# Patient Record
Sex: FEMALE | Race: WHITE | URBAN - METROPOLITAN AREA
[De-identification: names, ages, dates, MRNs, and addresses within clinical notes are randomized per-mention and may not be internally consistent; named-entity substitution may affect disease eponyms.]

---

## 2017-07-07 ENCOUNTER — TELEPHONE (OUTPATIENT)
Dept: FAMILY MEDICINE CLINIC | Facility: CLINIC | Age: 52
End: 2017-07-07

## 2017-07-07 RX ORDER — FUROSEMIDE 40 MG/1
TABLET ORAL DAILY PRN
Qty: 30 TABLET | Refills: 0 | Status: SHIPPED | OUTPATIENT
Start: 2017-07-07 | End: 2017-08-07

## 2017-07-07 NOTE — TELEPHONE ENCOUNTER
Pt last seen 12/2016 with Angel Goldman. Approved 30 days of med until seen. Please call patient and have her schedule her 6 month follow up for any further additional refills.  thanks

## 2017-08-07 ENCOUNTER — TELEPHONE (OUTPATIENT)
Dept: FAMILY MEDICINE CLINIC | Facility: CLINIC | Age: 52
End: 2017-08-07

## 2017-08-07 RX ORDER — FUROSEMIDE 40 MG/1
TABLET ORAL DAILY PRN
Qty: 30 TABLET | Refills: 0 | Status: SHIPPED | OUTPATIENT
Start: 2017-08-07 | End: 2017-08-22

## 2017-08-07 NOTE — TELEPHONE ENCOUNTER
Furosemide approved qty 30 NR  Future Appointments  Date Time Provider Kaleigh Chloe   8/18/2017 11:00 AM John Lazo PA-C [facility] [place]

## 2017-08-07 NOTE — TELEPHONE ENCOUNTER
Reny Cortés is coming in to see Minerva Martínez on Aug 17th she needs a refill on her water pill sent to her pharmacy on file.

## 2017-08-18 ENCOUNTER — OFFICE VISIT (OUTPATIENT)
Dept: FAMILY MEDICINE CLINIC | Facility: CLINIC | Age: 52
End: 2017-08-18

## 2017-08-18 ENCOUNTER — APPOINTMENT (OUTPATIENT)
Dept: LAB | Age: 52
End: 2017-08-18
Attending: FAMILY MEDICINE
Payer: OTHER GOVERNMENT

## 2017-08-18 VITALS
BODY MASS INDEX: 47.09 KG/M2 | SYSTOLIC BLOOD PRESSURE: 152 MMHG | HEIGHT: 66 IN | HEART RATE: 92 BPM | DIASTOLIC BLOOD PRESSURE: 102 MMHG | WEIGHT: 293 LBS

## 2017-08-18 DIAGNOSIS — E78.5 HYPERLIPIDEMIA, UNSPECIFIED HYPERLIPIDEMIA TYPE: ICD-10-CM

## 2017-08-18 DIAGNOSIS — M79.7 FIBROMYALGIA: ICD-10-CM

## 2017-08-18 DIAGNOSIS — F17.200 SMOKER UNMOTIVATED TO QUIT: ICD-10-CM

## 2017-08-18 DIAGNOSIS — I10 ESSENTIAL HYPERTENSION WITH GOAL BLOOD PRESSURE LESS THAN 140/90: ICD-10-CM

## 2017-08-18 DIAGNOSIS — R73.01 IMPAIRED FASTING GLUCOSE: ICD-10-CM

## 2017-08-18 DIAGNOSIS — E53.8 VITAMIN B 12 DEFICIENCY: ICD-10-CM

## 2017-08-18 DIAGNOSIS — E55.9 VITAMIN D DEFICIENCY: ICD-10-CM

## 2017-08-18 DIAGNOSIS — I89.0 LYMPHEDEMA OF BOTH LOWER EXTREMITIES: ICD-10-CM

## 2017-08-18 DIAGNOSIS — E03.9 HYPOTHYROIDISM, UNSPECIFIED TYPE: Primary | ICD-10-CM

## 2017-08-18 DIAGNOSIS — F40.01 AGORAPHOBIA WITH PANIC DISORDER: ICD-10-CM

## 2017-08-18 DIAGNOSIS — R89.9 ABNORMAL LABORATORY TEST RESULT: ICD-10-CM

## 2017-08-18 LAB
25-HYDROXYVITAMIN D (TOTAL): 8.4 NG/ML (ref 30–100)
ALBUMIN SERPL-MCNC: 3.5 G/DL (ref 3.5–4.8)
ALP LIVER SERPL-CCNC: 133 U/L (ref 41–108)
ALT SERPL-CCNC: 51 U/L (ref 14–54)
AST SERPL-CCNC: 24 U/L (ref 15–41)
BASOPHILS # BLD AUTO: 0.09 X10(3) UL (ref 0–0.1)
BASOPHILS NFR BLD AUTO: 0.8 %
BILIRUB SERPL-MCNC: 0.3 MG/DL (ref 0.1–2)
BILIRUBIN: NEGATIVE
BUN BLD-MCNC: 6 MG/DL (ref 8–20)
CALCIUM BLD-MCNC: 10.8 MG/DL (ref 8.3–10.3)
CHLORIDE: 109 MMOL/L (ref 101–111)
CHOLEST SMN-MCNC: 240 MG/DL (ref ?–200)
CO2: 21 MMOL/L (ref 22–32)
CREAT BLD-MCNC: 0.67 MG/DL (ref 0.55–1.02)
EOSINOPHIL # BLD AUTO: 0.09 X10(3) UL (ref 0–0.3)
EOSINOPHIL NFR BLD AUTO: 0.8 %
ERYTHROCYTE [DISTWIDTH] IN BLOOD BY AUTOMATED COUNT: 14.4 % (ref 11.5–16)
EST. AVERAGE GLUCOSE BLD GHB EST-MCNC: 140 MG/DL (ref 68–126)
FREE T4: 1.3 NG/DL (ref 0.9–1.8)
GLUCOSE (URINE DIPSTICK): NEGATIVE MG/DL
GLUCOSE BLD-MCNC: 107 MG/DL (ref 70–99)
HAV AB SERPL IA-ACNC: 248 PG/ML (ref 193–986)
HBA1C MFR BLD HPLC: 6.5 % (ref ?–5.7)
HCT VFR BLD AUTO: 47.9 % (ref 34–50)
HDLC SERPL-MCNC: 43 MG/DL (ref 45–?)
HDLC SERPL: 5.58 {RATIO} (ref ?–4.44)
HGB BLD-MCNC: 15.5 G/DL (ref 12–16)
IMMATURE GRANULOCYTE COUNT: 0.04 X10(3) UL (ref 0–1)
IMMATURE GRANULOCYTE RATIO %: 0.3 %
KETONES (URINE DIPSTICK): NEGATIVE MG/DL
LDLC SERPL CALC-MCNC: 154 MG/DL (ref ?–130)
LDLC SERPL-MCNC: 43 MG/DL (ref 5–40)
LEUKOCYTES: NEGATIVE
LYMPHOCYTES # BLD AUTO: 2.6 X10(3) UL (ref 0.9–4)
LYMPHOCYTES NFR BLD AUTO: 22.3 %
M PROTEIN MFR SERPL ELPH: 7.5 G/DL (ref 6.1–8.3)
MCH RBC QN AUTO: 31.7 PG (ref 27–33.2)
MCHC RBC AUTO-ENTMCNC: 32.4 G/DL (ref 31–37)
MCV RBC AUTO: 98 FL (ref 81–100)
MONOCYTES # BLD AUTO: 0.55 X10(3) UL (ref 0.1–0.6)
MONOCYTES NFR BLD AUTO: 4.7 %
MULTISTIX LOT#: NORMAL NUMERIC
NEUTROPHIL ABS PRELIM: 8.29 X10 (3) UL (ref 1.3–6.7)
NEUTROPHILS # BLD AUTO: 8.29 X10(3) UL (ref 1.3–6.7)
NEUTROPHILS NFR BLD AUTO: 71.1 %
NITRITE, URINE: NEGATIVE
NONHDLC SERPL-MCNC: 197 MG/DL (ref ?–130)
PH, URINE: 6 (ref 4.5–8)
PLATELET # BLD AUTO: 223 10(3)UL (ref 150–450)
POTASSIUM SERPL-SCNC: 4.1 MMOL/L (ref 3.6–5.1)
PROTEIN (URINE DIPSTICK): NEGATIVE MG/DL
RBC # BLD AUTO: 4.89 X10(6)UL (ref 3.8–5.1)
RED CELL DISTRIBUTION WIDTH-SD: 51.8 FL (ref 35.1–46.3)
SODIUM SERPL-SCNC: 139 MMOL/L (ref 136–144)
SPECIFIC GRAVITY: 1 (ref 1–1.03)
T3 SERPL-MCNC: 77 NG/DL (ref 60–181)
TRIGLYCERIDES: 214 MG/DL (ref ?–150)
TSI SER-ACNC: 1.15 MIU/ML (ref 0.35–5.5)
UROBILINOGEN,SEMI-QN: 0.2 MG/DL (ref 0–1.9)
WBC # BLD AUTO: 11.7 X10(3) UL (ref 4–13)

## 2017-08-18 PROCEDURE — 87086 URINE CULTURE/COLONY COUNT: CPT | Performed by: FAMILY MEDICINE

## 2017-08-18 PROCEDURE — 81003 URINALYSIS AUTO W/O SCOPE: CPT | Performed by: FAMILY MEDICINE

## 2017-08-18 PROCEDURE — 99215 OFFICE O/P EST HI 40 MIN: CPT | Performed by: FAMILY MEDICINE

## 2017-08-18 RX ORDER — PRAVASTATIN SODIUM 20 MG
20 TABLET ORAL NIGHTLY
Qty: 30 TABLET | Refills: 5 | Status: SHIPPED | OUTPATIENT
Start: 2017-08-18

## 2017-08-18 RX ORDER — ALPRAZOLAM 0.25 MG/1
TABLET ORAL
Qty: 30 TABLET | Refills: 2 | Status: SHIPPED | OUTPATIENT
Start: 2017-08-18

## 2017-08-18 RX ORDER — CYANOCOBALAMIN 1000 UG/ML
1000 INJECTION INTRAMUSCULAR; SUBCUTANEOUS
Qty: 3 VIAL | Refills: 0 | Status: SHIPPED | OUTPATIENT
Start: 2017-08-18 | End: 2017-12-29

## 2017-08-18 RX ORDER — LEVOTHYROXINE SODIUM 112 UG/1
112 TABLET ORAL
Qty: 30 TABLET | Refills: 11 | Status: SHIPPED | OUTPATIENT
Start: 2017-08-18 | End: 2018-09-02

## 2017-08-18 RX ORDER — BUSPIRONE HYDROCHLORIDE 10 MG/1
10 TABLET ORAL 3 TIMES DAILY
Qty: 90 TABLET | Refills: 5 | Status: SHIPPED | OUTPATIENT
Start: 2017-08-18

## 2017-08-18 RX ORDER — TRAMADOL HYDROCHLORIDE 50 MG/1
TABLET ORAL EVERY 8 HOURS PRN
Qty: 30 TABLET | Refills: 2 | Status: SHIPPED | OUTPATIENT
Start: 2017-08-18

## 2017-08-18 RX ORDER — POTASSIUM CHLORIDE 1500 MG/1
TABLET, FILM COATED, EXTENDED RELEASE ORAL
Qty: 60 TABLET | Refills: 5 | Status: SHIPPED | OUTPATIENT
Start: 2017-08-18

## 2017-08-18 NOTE — PROGRESS NOTES
Marlen Lee is a 47year old female. HPI:   Patient presents for recheck of her hypertension, lymphedema, anxiety, hypothyroidism, fibromyalgia worsening, hyperlipidemia not taking nor did she starts pravastatin and refill on medications.     #1 Hy been more emotional 3   Component      Latest Ref Rng & Units 11/14/2016 11/14/2016          10:27 AM 10:27 AM   WBC      4.0 - 13.0 x10(3) uL  14.3 (H)   RBC      3.80 - 5.10 x10(6)uL  5.10   Hemoglobin      12.0 - 16.0 g/dL  16.3 (H)   Hematocrit      34 (H)   Vitamin B12      193 - 986 pg/mL  258   XA HNF/UFH        1   Icterus        1   Lipemia        1   ALK-PHOSPHATASE OTHER CALC      U/L 0    ALK-PHOSPHATASE BONE CALC      0 - 55 U/L 52    ALK-PHOSPHATASE LIVER CALC      0 - 94 U/L 74    MALB URINE lisinopril 10 MG Oral Tab Take 1 tablet (10 mg total) by mouth daily.  Disp: 30 tablet Rfl: 2   Needles & Syringes Does not apply Misc Use 1 3cc syringe with attached needle (25g 1\") once a month with Vitamin B12 injection Disp: 3 each Rfl: 0   nicotine dorsalis pedis pulses bilateral no varicosities, non pitting 2 + edema b/l    GI: normal bowel sounds, no masses, no pulsatile mass, no HSM or tenderness   EXTREMITIES: no cyanosis or clubbing   NEURO: CN II-XII grossly intact,  Alert and orientated X3  PS Consults:  None  1. Hypothyroidism, unspecified type  - TSH+FREE T4; Future  - TRIIODOTHYRONINE (T3) TOTAL; Future  - TSH+FREE T4  - TRIIODOTHYRONINE (T3) TOTAL  - Levothyroxine Sodium 112 MCG Oral Tab;  Take 1 tablet (112 mcg total) by mouth before breakfa cyanocobalamin 1000 MCG/ML Injection Solution; Inject 1 mL (1,000 mcg total) into the muscle every 30 (thirty) days. For 3 months  Dispense: 3 vial; Refill: 0    7.  Lymphedema of both lower extremities  Compression, elevation and decrease NaCl  Get 2 D ech

## 2017-08-20 NOTE — PROGRESS NOTES
Follow up needed. Multiple abnormalities including new onset diabetes, B12 deficiency, vitamin D deficiency, elevated calcium, elevated lipid panel and elevated alkaline phosphatase.   Needs to take vitamin D daily patient has not done well with prescripti

## 2017-08-22 ENCOUNTER — TELEPHONE (OUTPATIENT)
Dept: FAMILY MEDICINE CLINIC | Facility: CLINIC | Age: 52
End: 2017-08-22

## 2017-08-22 DIAGNOSIS — R73.02 GLUCOSE INTOLERANCE (IMPAIRED GLUCOSE TOLERANCE): Primary | ICD-10-CM

## 2017-08-22 DIAGNOSIS — R89.9 ABNORMAL LABORATORY TEST: ICD-10-CM

## 2017-08-22 LAB
SEX HORMONE BINDING GLOBULIN: 74 NMOL/L
TESTOSTERONE -MS, BIOAVAILAB: 10.8 NG/DL
TESTOSTERONE, -MS/MS: 41 NG/DL
TESTOSTERONE, FREE -MS/MS: 4.1 PG/ML

## 2017-08-22 RX ORDER — FUROSEMIDE 40 MG/1
TABLET ORAL DAILY PRN
Qty: 30 TABLET | Refills: 3 | Status: SHIPPED | OUTPATIENT
Start: 2017-08-22 | End: 2017-12-29

## 2017-08-22 RX ORDER — DOXYCYCLINE HYCLATE 100 MG
100 TABLET ORAL DAILY
Qty: 7 TABLET | Refills: 0 | Status: SHIPPED | OUTPATIENT
Start: 2017-08-22

## 2017-08-22 NOTE — TELEPHONE ENCOUNTER
Pt informed of test results and recommendations. Pt states that she have labs completed next week, doxycyline sent to local pharmacy. Ok per Tiny Motley for refill of Lasix 40 mg with 3 refills.   Pt informed, she verbalized understanding and had no additional

## 2017-08-22 NOTE — TELEPHONE ENCOUNTER
----- Message from Vivek Beltran PA-C sent at 8/20/2017 11:45 AM CDT -----  Follow up needed.   Multiple abnormalities including new onset diabetes, B12 deficiency, vitamin D deficiency, elevated calcium, elevated lipid panel and elevated alkaline phosp

## 2017-08-22 NOTE — PROGRESS NOTES
Testosterone is elevated again advise endocrine consult. Also would recommend sleep study she could do a home sleep study due to fatigue, hypertension and weight issues. She is at high risk for sleep apnea.   Call patient with details

## 2017-09-05 ENCOUNTER — TELEPHONE (OUTPATIENT)
Dept: FAMILY MEDICINE CLINIC | Facility: CLINIC | Age: 52
End: 2017-09-05

## 2017-09-05 NOTE — TELEPHONE ENCOUNTER
Pt is calling because she is having severe neck swelling/pain. She is scheduled to have an abdominal ultrasound and thyroid ultrasound. Pt would like to know if Katherine Aguila would like to also check her parathyroid due to the swelling.   If so pt said she yulissa

## 2017-09-06 NOTE — TELEPHONE ENCOUNTER
They will be going over the same area on U/S since the parathyroid gland sits right by the thyroid gland.

## 2017-09-07 RX ORDER — LISINOPRIL 10 MG/1
TABLET ORAL
Qty: 30 TABLET | Refills: 2 | Status: SHIPPED | OUTPATIENT
Start: 2017-09-07 | End: 2017-12-29

## 2017-09-08 ENCOUNTER — TELEPHONE (OUTPATIENT)
Dept: FAMILY MEDICINE CLINIC | Facility: CLINIC | Age: 52
End: 2017-09-08

## 2017-09-08 NOTE — TELEPHONE ENCOUNTER
Pt called back and she wants the test results mailed to her. She is going to let us know where to fax the orders for the NM test as well. She is going to get all her questions together for the Endo as well.

## 2017-09-08 NOTE — TELEPHONE ENCOUNTER
Spoke to patient and after talking will send pt thru her mychart all of the instructions information from Angel Goldman that pt will need to follow up on as well as Endo contact information.   Stressed to patient the importance of seeing an ENDO and pt verbalize

## 2017-09-08 NOTE — TELEPHONE ENCOUNTER
Fernando Martinez is calling to let Yusuf Butts know she is having a problem retrieving her information from her e- mail, she would like Yusuf Butts to give her a call.  [phone]

## 2017-09-08 NOTE — TELEPHONE ENCOUNTER
jonathanom for pt to call office-does she have an alternate email address? Would she like them mailed or faxed maybe instead?

## 2017-09-29 ENCOUNTER — TELEPHONE (OUTPATIENT)
Dept: FAMILY MEDICINE CLINIC | Facility: CLINIC | Age: 52
End: 2017-09-29

## 2017-09-29 DIAGNOSIS — R79.89 ELEVATED PTHRP LEVEL: Primary | ICD-10-CM

## 2017-09-29 NOTE — TELEPHONE ENCOUNTER
Pt called and said she needs an order sent to [facility] for a Nuc Med NP Parathyroid Scan. It needs to be faxed to [phone].

## 2017-11-17 ENCOUNTER — TELEPHONE (OUTPATIENT)
Dept: FAMILY MEDICINE CLINIC | Facility: CLINIC | Age: 52
End: 2017-11-17

## 2017-11-17 NOTE — TELEPHONE ENCOUNTER
Per Brina Keita PA-C, the patient was contacted regarding a faxed communication from Renee Hill that the patient never came in for her appointment for the Parathyroid Nuclear Scan on 10/04/2017.   Per Brina Keita PA-C, th

## 2017-11-17 NOTE — TELEPHONE ENCOUNTER
Florencia Peralta is returning a call to Pervis Earing please call her, she will be home the rest of the night

## 2017-11-20 NOTE — TELEPHONE ENCOUNTER
The patient stated that she did not get the test done yet because of issues with transportation. She is planning to get the testing done either the first or second week of December.   She has someone in mind that should be able to get her a ride, but they

## 2017-12-29 ENCOUNTER — TELEPHONE (OUTPATIENT)
Dept: FAMILY MEDICINE CLINIC | Facility: CLINIC | Age: 52
End: 2017-12-29

## 2017-12-29 DIAGNOSIS — R06.02 SHORTNESS OF BREATH: ICD-10-CM

## 2017-12-29 DIAGNOSIS — E03.9 HYPOTHYROIDISM, UNSPECIFIED TYPE: ICD-10-CM

## 2017-12-29 DIAGNOSIS — E53.8 VITAMIN B 12 DEFICIENCY: ICD-10-CM

## 2017-12-29 DIAGNOSIS — R79.89 ABNORMAL CBC: ICD-10-CM

## 2017-12-29 DIAGNOSIS — R60.1 GENERALIZED EDEMA: Primary | ICD-10-CM

## 2017-12-29 DIAGNOSIS — E34.9 ELEVATED PARATHYROID HORMONE: ICD-10-CM

## 2017-12-29 DIAGNOSIS — I10 ESSENTIAL HYPERTENSION WITH GOAL BLOOD PRESSURE LESS THAN 140/90: ICD-10-CM

## 2017-12-29 RX ORDER — FUROSEMIDE 40 MG/1
TABLET ORAL DAILY PRN
Qty: 30 TABLET | Refills: 3 | Status: SHIPPED | OUTPATIENT
Start: 2017-12-29 | End: 2018-04-24

## 2017-12-29 RX ORDER — CYANOCOBALAMIN 1000 UG/ML
1000 INJECTION INTRAMUSCULAR; SUBCUTANEOUS
Qty: 3 VIAL | Refills: 0 | Status: SHIPPED | OUTPATIENT
Start: 2017-12-29

## 2017-12-29 RX ORDER — LISINOPRIL 10 MG/1
10 TABLET ORAL
Qty: 30 TABLET | Refills: 2 | Status: SHIPPED | OUTPATIENT
Start: 2017-12-29 | End: 2018-06-01

## 2017-12-29 NOTE — TELEPHONE ENCOUNTER
The patient was informed that her Nuclear Medicine Parathyroid Scan with Spect was negative. The patient reported that she had two scans done not just one. The patient is requesting PTH, Thyroid testing, electrolytes, and 2D echo.   Per Alfred Pompa,

## 2017-12-29 NOTE — TELEPHONE ENCOUNTER
Pt called in and wants to speak to [facility] with some questions.     Message forwarded to [facility]

## 2018-01-03 DIAGNOSIS — E53.8 VITAMIN B 12 DEFICIENCY: ICD-10-CM

## 2018-01-03 RX ORDER — CYANOCOBALAMIN 1000 UG/ML
1000 INJECTION INTRAMUSCULAR; SUBCUTANEOUS
Qty: 3 ML | Refills: 0 | OUTPATIENT
Start: 2018-01-03

## 2018-04-09 ENCOUNTER — TELEPHONE (OUTPATIENT)
Dept: FAMILY MEDICINE CLINIC | Facility: CLINIC | Age: 53
End: 2018-04-09

## 2018-04-17 ENCOUNTER — TELEPHONE (OUTPATIENT)
Dept: FAMILY MEDICINE CLINIC | Facility: CLINIC | Age: 53
End: 2018-04-17

## 2018-04-17 NOTE — TELEPHONE ENCOUNTER
Sharmaine,please advise refills for the patient. She will be seeing a new provider since we do not take her insurance.

## 2018-04-17 NOTE — TELEPHONE ENCOUNTER
Pt called and said she is in the middle of changing insurance and doctors because we no longer accept her insurance, but she has run out of her Lasix and Potassium. She would like that called into [facility] in [facility].

## 2018-04-24 RX ORDER — CIPROFLOXACIN 250 MG/1
TABLET, FILM COATED ORAL
Qty: 14 TABLET | Refills: 0 | OUTPATIENT
Start: 2018-04-24

## 2018-04-24 RX ORDER — FUROSEMIDE 40 MG/1
TABLET ORAL DAILY PRN
Qty: 30 TABLET | Refills: 0 | Status: SHIPPED | OUTPATIENT
Start: 2018-04-24 | End: 2018-06-01

## 2018-04-24 NOTE — TELEPHONE ENCOUNTER
Furosemide approved qty 30 NR  Patient past due for ov for HTN  Please call to schedule appt-will need for further refills  [phone] (home)

## 2018-06-01 RX ORDER — FUROSEMIDE 40 MG/1
TABLET ORAL DAILY PRN
Qty: 30 TABLET | Refills: 0 | Status: SHIPPED | OUTPATIENT
Start: 2018-06-01 | End: 2019-05-27

## 2018-06-01 RX ORDER — LISINOPRIL 10 MG/1
10 TABLET ORAL
Qty: 30 TABLET | Refills: 0 | Status: SHIPPED | OUTPATIENT
Start: 2018-06-01

## 2018-06-01 NOTE — TELEPHONE ENCOUNTER
rxs approved qty 30 NR  Patient past due for ov  Please call to schedule appt-will need for further refills  [phone] (home)

## 2018-07-21 ENCOUNTER — TELEPHONE (OUTPATIENT)
Dept: FAMILY MEDICINE CLINIC | Facility: CLINIC | Age: 53
End: 2018-07-21

## 2018-09-02 DIAGNOSIS — E03.9 HYPOTHYROIDISM, UNSPECIFIED TYPE: ICD-10-CM

## 2018-09-04 RX ORDER — LEVOTHYROXINE SODIUM 112 UG/1
TABLET ORAL
Qty: 30 TABLET | Refills: 0 | Status: SHIPPED | OUTPATIENT
Start: 2018-09-04

## 2018-09-04 NOTE — TELEPHONE ENCOUNTER
Levothyroxine approved qty 30 NR  Patient needs appt for any further refills  Please call to schedule appt  [phone] (home)

## 2019-05-01 ENCOUNTER — PATIENT OUTREACH (OUTPATIENT)
Dept: FAMILY MEDICINE CLINIC | Facility: CLINIC | Age: 54
End: 2019-05-01